# Patient Record
Sex: MALE | Race: OTHER | Employment: OTHER | URBAN - METROPOLITAN AREA
[De-identification: names, ages, dates, MRNs, and addresses within clinical notes are randomized per-mention and may not be internally consistent; named-entity substitution may affect disease eponyms.]

---

## 2021-07-06 ENCOUNTER — APPOINTMENT (OUTPATIENT)
Dept: GENERAL RADIOLOGY | Facility: HOSPITAL | Age: 36
End: 2021-07-06
Attending: EMERGENCY MEDICINE
Payer: COMMERCIAL

## 2021-07-06 ENCOUNTER — HOSPITAL ENCOUNTER (EMERGENCY)
Facility: HOSPITAL | Age: 36
Discharge: HOME | End: 2021-07-06
Attending: EMERGENCY MEDICINE
Payer: COMMERCIAL

## 2021-07-06 VITALS
HEART RATE: 78 BPM | HEIGHT: 73 IN | TEMPERATURE: 98 F | SYSTOLIC BLOOD PRESSURE: 124 MMHG | DIASTOLIC BLOOD PRESSURE: 75 MMHG | OXYGEN SATURATION: 97 % | RESPIRATION RATE: 18 BRPM | BODY MASS INDEX: 41.75 KG/M2 | WEIGHT: 315 LBS

## 2021-07-06 DIAGNOSIS — R07.89 CHEST PAIN, MUSCULOSKELETAL: Primary | ICD-10-CM

## 2021-07-06 LAB
ALBUMIN SERPL-MCNC: 3.8 G/DL (ref 3.4–5)
ALBUMIN/GLOB SERPL: 1.1 {RATIO} (ref 1–2)
ALP LIVER SERPL-CCNC: 67 U/L
ALT SERPL-CCNC: 37 U/L
ANION GAP SERPL CALC-SCNC: 6 MMOL/L (ref 0–18)
AST SERPL-CCNC: 24 U/L (ref 15–37)
ATRIAL RATE: 92 BPM
BASOPHILS # BLD AUTO: 0.04 X10(3) UL (ref 0–0.2)
BASOPHILS NFR BLD AUTO: 0.3 %
BILIRUB SERPL-MCNC: 0.3 MG/DL (ref 0.1–2)
BUN BLD-MCNC: 18 MG/DL (ref 7–18)
BUN/CREAT SERPL: 18 (ref 10–20)
CALCIUM BLD-MCNC: 8.8 MG/DL (ref 8.5–10.1)
CHLORIDE SERPL-SCNC: 105 MMOL/L (ref 98–112)
CO2 SERPL-SCNC: 28 MMOL/L (ref 21–32)
CREAT BLD-MCNC: 1 MG/DL
DEPRECATED RDW RBC AUTO: 39.9 FL (ref 35.1–46.3)
EOSINOPHIL # BLD AUTO: 0.58 X10(3) UL (ref 0–0.7)
EOSINOPHIL NFR BLD AUTO: 4.7 %
ERYTHROCYTE [DISTWIDTH] IN BLOOD BY AUTOMATED COUNT: 12.4 % (ref 11–15)
GLOBULIN PLAS-MCNC: 3.6 G/DL (ref 2.8–4.4)
GLUCOSE BLD-MCNC: 121 MG/DL (ref 70–99)
HCT VFR BLD AUTO: 44.8 %
HGB BLD-MCNC: 15.6 G/DL
IMM GRANULOCYTES # BLD AUTO: 0.04 X10(3) UL (ref 0–1)
IMM GRANULOCYTES NFR BLD: 0.3 %
LYMPHOCYTES # BLD AUTO: 3.7 X10(3) UL (ref 1–4)
LYMPHOCYTES NFR BLD AUTO: 30.2 %
M PROTEIN MFR SERPL ELPH: 7.4 G/DL (ref 6.4–8.2)
MCH RBC QN AUTO: 30.7 PG (ref 26–34)
MCHC RBC AUTO-ENTMCNC: 34.8 G/DL (ref 31–37)
MCV RBC AUTO: 88.2 FL
MONOCYTES # BLD AUTO: 1.19 X10(3) UL (ref 0.1–1)
MONOCYTES NFR BLD AUTO: 9.7 %
NEUTROPHILS # BLD AUTO: 6.7 X10 (3) UL (ref 1.5–7.7)
NEUTROPHILS # BLD AUTO: 6.7 X10(3) UL (ref 1.5–7.7)
NEUTROPHILS NFR BLD AUTO: 54.8 %
OSMOLALITY SERPL CALC.SUM OF ELEC: 291 MOSM/KG (ref 275–295)
P AXIS: 38 DEGREES
P-R INTERVAL: 158 MS
PLATELET # BLD AUTO: 208 10(3)UL (ref 150–450)
POTASSIUM SERPL-SCNC: 3.8 MMOL/L (ref 3.5–5.1)
Q-T INTERVAL: 358 MS
QRS DURATION: 94 MS
QTC CALCULATION (BEZET): 442 MS
R AXIS: 65 DEGREES
RBC # BLD AUTO: 5.08 X10(6)UL
SODIUM SERPL-SCNC: 139 MMOL/L (ref 136–145)
T AXIS: 24 DEGREES
TROPONIN I SERPL-MCNC: <0.045 NG/ML (ref ?–0.04)
VENTRICULAR RATE: 92 BPM
WBC # BLD AUTO: 12.3 X10(3) UL (ref 4–11)

## 2021-07-06 PROCEDURE — 80053 COMPREHEN METABOLIC PANEL: CPT | Performed by: EMERGENCY MEDICINE

## 2021-07-06 PROCEDURE — 85025 COMPLETE CBC W/AUTO DIFF WBC: CPT | Performed by: EMERGENCY MEDICINE

## 2021-07-06 PROCEDURE — 96374 THER/PROPH/DIAG INJ IV PUSH: CPT

## 2021-07-06 PROCEDURE — 93010 ELECTROCARDIOGRAM REPORT: CPT

## 2021-07-06 PROCEDURE — 99284 EMERGENCY DEPT VISIT MOD MDM: CPT

## 2021-07-06 PROCEDURE — 71045 X-RAY EXAM CHEST 1 VIEW: CPT | Performed by: EMERGENCY MEDICINE

## 2021-07-06 PROCEDURE — 93005 ELECTROCARDIOGRAM TRACING: CPT

## 2021-07-06 PROCEDURE — 84484 ASSAY OF TROPONIN QUANT: CPT | Performed by: EMERGENCY MEDICINE

## 2021-07-06 RX ORDER — IBUPROFEN 600 MG/1
600 TABLET ORAL EVERY 8 HOURS PRN
Qty: 30 TABLET | Refills: 0 | Status: SHIPPED | OUTPATIENT
Start: 2021-07-06 | End: 2021-07-13

## 2021-07-06 RX ORDER — FAMOTIDINE 20 MG/1
20 TABLET ORAL 2 TIMES DAILY PRN
Qty: 30 TABLET | Refills: 0 | Status: SHIPPED | OUTPATIENT
Start: 2021-07-06 | End: 2021-08-05

## 2021-07-06 RX ORDER — KETOROLAC TROMETHAMINE 30 MG/ML
30 INJECTION, SOLUTION INTRAMUSCULAR; INTRAVENOUS ONCE
Status: COMPLETED | OUTPATIENT
Start: 2021-07-06 | End: 2021-07-06

## 2021-07-06 NOTE — ED INITIAL ASSESSMENT (HPI)
Pt arrives with c/o left upper chest wall pain. The pain started around 0000. Describes it as pressure that goes up his neck. Pt also has a headache.

## 2021-07-06 NOTE — ED PROVIDER NOTES
Patient Seen in: [facility] Emergency Department      History   Patient presents with:  Chest Pain Angina    Stated Complaint: chest pain    HPI/Subjective:   HPI    The patient is a 49-year-old male with a history of diabetes, presenting to the [facility] Physical Exam  General: Pleasant overweight adult male with a BMI of 44.86, who is conversant, comfortable and well appearing. Alert and oriented in no distress. Neuro: No focal neurologic deficits. No facial droop or slurred speech.   Grossly nor DIFFERENTIAL[[id number]]          Abnormal            Final result                 Please view results for these tests on the individual orders.    RAINBOW DRAW LAVENDER   RAINBOW DRAW LIGHT GREEN   RAINBOW DRAW GOLD     EKG    Rate, intervals and axes as no that he has a sensation like it is hard to swallow, so there may be a component of GERD. So he is to use NSAIDs, and also an H2 blocker. Keep an eye on his symptoms, return if his symptoms worsen. Otherwise follow-up with primary care as an outpatient.

## 2022-10-12 ENCOUNTER — OFFICE VISIT (OUTPATIENT)
Dept: ENDOCRINOLOGY CLINIC | Facility: CLINIC | Age: 37
End: 2022-10-12
Payer: COMMERCIAL

## 2022-10-12 VITALS
WEIGHT: 289.81 LBS | RESPIRATION RATE: 18 BRPM | SYSTOLIC BLOOD PRESSURE: 128 MMHG | DIASTOLIC BLOOD PRESSURE: 88 MMHG | OXYGEN SATURATION: 98 % | HEART RATE: 99 BPM | BODY MASS INDEX: 38 KG/M2

## 2022-10-12 DIAGNOSIS — N48.1 BALANITIS: ICD-10-CM

## 2022-10-12 DIAGNOSIS — E11.65 TYPE 2 DIABETES MELLITUS WITH HYPERGLYCEMIA, WITHOUT LONG-TERM CURRENT USE OF INSULIN (HCC): Primary | ICD-10-CM

## 2022-10-12 LAB
BILIRUBIN: NEGATIVE
CARTRIDGE LOT#: 505 NUMERIC
CREAT UR-SCNC: 35.3 MG/DL
GLUCOSE (URINE DIPSTICK): 500 MG/DL
HEMOGLOBIN A1C: 13.2 % (ref 4.3–5.6)
KETONES (URINE DIPSTICK): 40 MG/DL
LEUKOCYTES: NEGATIVE
MICROALBUMIN UR-MCNC: 5.66 MG/DL
MICROALBUMIN/CREAT 24H UR-RTO: 160.3 UG/MG (ref ?–30)
MULTISTIX LOT#: ABNORMAL NUMERIC
NITRITE, URINE: NEGATIVE
PH, URINE: 6 (ref 4.5–8)
PROTEIN (URINE DIPSTICK): NEGATIVE MG/DL
SPECIFIC GRAVITY: 1.01 (ref 1–1.03)
TEST STRIP LOT #: NORMAL NUMERIC
UROBILINOGEN,SEMI-QN: 0.2 MG/DL (ref 0–1.9)

## 2022-10-12 PROCEDURE — 82947 ASSAY GLUCOSE BLOOD QUANT: CPT | Performed by: NURSE PRACTITIONER

## 2022-10-12 PROCEDURE — 83036 HEMOGLOBIN GLYCOSYLATED A1C: CPT | Performed by: NURSE PRACTITIONER

## 2022-10-12 PROCEDURE — 3074F SYST BP LT 130 MM HG: CPT | Performed by: NURSE PRACTITIONER

## 2022-10-12 PROCEDURE — 81003 URINALYSIS AUTO W/O SCOPE: CPT | Performed by: NURSE PRACTITIONER

## 2022-10-12 PROCEDURE — 99204 OFFICE O/P NEW MOD 45 MIN: CPT | Performed by: NURSE PRACTITIONER

## 2022-10-12 PROCEDURE — 3079F DIAST BP 80-89 MM HG: CPT | Performed by: NURSE PRACTITIONER

## 2022-10-12 RX ORDER — FLUCONAZOLE 150 MG/1
TABLET ORAL
Qty: 2 TABLET | Refills: 0 | Status: SHIPPED | OUTPATIENT
Start: 2022-10-12

## 2022-10-12 RX ORDER — INSULIN DEGLUDEC INJECTION 100 U/ML
10 INJECTION, SOLUTION SUBCUTANEOUS DAILY
Qty: 3 ML | Refills: 0 | COMMUNITY
Start: 2022-10-12

## 2022-10-12 RX ORDER — SEMAGLUTIDE 1.34 MG/ML
0.25 INJECTION, SOLUTION SUBCUTANEOUS
Qty: 1.5 ML | Refills: 0 | Status: SHIPPED | COMMUNITY
Start: 2022-10-12 | End: 2022-11-03

## 2022-10-12 RX ORDER — LANCETS
1 EACH MISCELLANEOUS 3 TIMES DAILY
Qty: 100 EACH | Refills: 2 | Status: SHIPPED | OUTPATIENT
Start: 2022-10-12 | End: 2023-10-12

## 2022-10-13 LAB
BILIRUB UR QL: NEGATIVE
CLARITY UR: CLEAR
COLOR UR: YELLOW
GLUCOSE UR-MCNC: >=500 MG/DL
HGB UR QL STRIP.AUTO: NEGATIVE
KETONES UR-MCNC: 20 MG/DL
LEUKOCYTE ESTERASE UR QL STRIP.AUTO: NEGATIVE
NITRITE UR QL STRIP.AUTO: NEGATIVE
PH UR: 6 [PH] (ref 5–8)
PROT UR-MCNC: NEGATIVE MG/DL
SP GR UR STRIP: >1.03 (ref 1–1.03)
UROBILINOGEN UR STRIP-ACNC: <2
VIT C UR-MCNC: NEGATIVE MG/DL

## 2022-10-28 ENCOUNTER — OFFICE VISIT (OUTPATIENT)
Dept: ENDOCRINOLOGY CLINIC | Facility: CLINIC | Age: 37
End: 2022-10-28
Payer: COMMERCIAL

## 2022-10-28 VITALS
BODY MASS INDEX: 38 KG/M2 | OXYGEN SATURATION: 98 % | SYSTOLIC BLOOD PRESSURE: 110 MMHG | RESPIRATION RATE: 18 BRPM | WEIGHT: 290.19 LBS | DIASTOLIC BLOOD PRESSURE: 78 MMHG | HEART RATE: 92 BPM

## 2022-10-28 DIAGNOSIS — E11.65 TYPE 2 DIABETES MELLITUS WITH HYPERGLYCEMIA, WITHOUT LONG-TERM CURRENT USE OF INSULIN (HCC): Primary | ICD-10-CM

## 2022-10-28 PROCEDURE — 99214 OFFICE O/P EST MOD 30 MIN: CPT | Performed by: NURSE PRACTITIONER

## 2022-10-28 PROCEDURE — 3078F DIAST BP <80 MM HG: CPT | Performed by: NURSE PRACTITIONER

## 2022-10-28 PROCEDURE — 3074F SYST BP LT 130 MM HG: CPT | Performed by: NURSE PRACTITIONER

## 2022-10-28 RX ORDER — PERPHENAZINE 16 MG/1
TABLET, FILM COATED ORAL
Qty: 200 EACH | Refills: 0 | Status: SHIPPED | OUTPATIENT
Start: 2022-10-28

## 2022-10-28 RX ORDER — INSULIN DEGLUDEC INJECTION 100 U/ML
30 INJECTION, SOLUTION SUBCUTANEOUS DAILY
Qty: 30 ML | Refills: 1 | Status: SHIPPED | OUTPATIENT
Start: 2022-10-28

## 2022-10-28 RX ORDER — SEMAGLUTIDE 1.34 MG/ML
0.5 INJECTION, SOLUTION SUBCUTANEOUS
Qty: 1.5 ML | Refills: 2 | Status: SHIPPED | OUTPATIENT
Start: 2022-10-28

## 2022-11-18 ENCOUNTER — OFFICE VISIT (OUTPATIENT)
Dept: ENDOCRINOLOGY CLINIC | Facility: CLINIC | Age: 37
End: 2022-11-18
Payer: COMMERCIAL

## 2022-11-18 VITALS
WEIGHT: 293.63 LBS | SYSTOLIC BLOOD PRESSURE: 122 MMHG | RESPIRATION RATE: 18 BRPM | OXYGEN SATURATION: 98 % | BODY MASS INDEX: 39 KG/M2 | DIASTOLIC BLOOD PRESSURE: 82 MMHG | HEART RATE: 90 BPM

## 2022-11-18 DIAGNOSIS — E11.65 TYPE 2 DIABETES MELLITUS WITH HYPERGLYCEMIA, WITHOUT LONG-TERM CURRENT USE OF INSULIN (HCC): Primary | ICD-10-CM

## 2022-11-18 LAB
CARTRIDGE LOT#: 524 NUMERIC
HEMOGLOBIN A1C: 10.4 % (ref 4.3–5.6)

## 2023-10-13 DIAGNOSIS — E11.65 TYPE 2 DIABETES MELLITUS WITH HYPERGLYCEMIA, WITHOUT LONG-TERM CURRENT USE OF INSULIN (HCC): ICD-10-CM

## 2023-10-16 NOTE — TELEPHONE ENCOUNTER
Requested Prescriptions     Pending Prescriptions Disp Refills    METFORMIN HCL 1000 MG Oral Tab [Pharmacy Med Name: METFORMIN 1000MG TABLETS] 180 tablet 1     Sig: TAKE 1 TABLET(1000 MG) BY MOUTH TWICE DAILY WITH MEALS     Last A1c value was 10.4% done 11/18/2022.     Refill 10/12/22  LOV 11/18/22  No FU - pt hasn't been seen in almost a year

## 2024-06-06 DIAGNOSIS — E11.65 TYPE 2 DIABETES MELLITUS WITH HYPERGLYCEMIA, WITHOUT LONG-TERM CURRENT USE OF INSULIN (HCC): ICD-10-CM

## 2024-06-07 NOTE — TELEPHONE ENCOUNTER
Please inform pt since he has not been seen in over a year we cannot refill medications - will need to discuss with pcp

## 2024-07-10 ENCOUNTER — HOSPITAL ENCOUNTER (EMERGENCY)
Facility: HOSPITAL | Age: 39
Discharge: HOME | End: 2024-07-10
Attending: EMERGENCY MEDICINE
Payer: COMMERCIAL

## 2024-07-10 ENCOUNTER — APPOINTMENT (OUTPATIENT)
Dept: GENERAL RADIOLOGY | Facility: HOSPITAL | Age: 39
End: 2024-07-10
Attending: EMERGENCY MEDICINE
Payer: COMMERCIAL

## 2024-07-10 VITALS
WEIGHT: 291 LBS | HEART RATE: 80 BPM | BODY MASS INDEX: 38 KG/M2 | SYSTOLIC BLOOD PRESSURE: 122 MMHG | OXYGEN SATURATION: 100 % | RESPIRATION RATE: 13 BRPM | DIASTOLIC BLOOD PRESSURE: 81 MMHG | TEMPERATURE: 97 F

## 2024-07-10 DIAGNOSIS — B37.42 CANDIDAL BALANITIS: Primary | ICD-10-CM

## 2024-07-10 DIAGNOSIS — R73.9 HYPERGLYCEMIA: ICD-10-CM

## 2024-07-10 LAB
ALBUMIN SERPL-MCNC: 3.8 G/DL (ref 3.4–5)
ALBUMIN/GLOB SERPL: 1 {RATIO} (ref 1–2)
ALP LIVER SERPL-CCNC: 64 U/L
ALT SERPL-CCNC: 37 U/L
ANION GAP SERPL CALC-SCNC: 10 MMOL/L (ref 0–18)
AST SERPL-CCNC: 14 U/L (ref 15–37)
BASOPHILS # BLD AUTO: 0.02 X10(3) UL (ref 0–0.2)
BASOPHILS NFR BLD AUTO: 0.2 %
BILIRUB SERPL-MCNC: 0.5 MG/DL (ref 0.1–2)
BUN BLD-MCNC: 14 MG/DL (ref 9–23)
CALCIUM BLD-MCNC: 9.3 MG/DL (ref 8.5–10.1)
CHLORIDE SERPL-SCNC: 103 MMOL/L (ref 98–112)
CO2 SERPL-SCNC: 26 MMOL/L (ref 21–32)
CREAT BLD-MCNC: 1.02 MG/DL
EGFRCR SERPLBLD CKD-EPI 2021: 96 ML/MIN/1.73M2 (ref 60–?)
EOSINOPHIL # BLD AUTO: 0.38 X10(3) UL (ref 0–0.7)
EOSINOPHIL NFR BLD AUTO: 4.2 %
ERYTHROCYTE [DISTWIDTH] IN BLOOD BY AUTOMATED COUNT: 12.1 %
GLOBULIN PLAS-MCNC: 3.7 G/DL (ref 2.8–4.4)
GLUCOSE BLD-MCNC: 287 MG/DL (ref 70–99)
GLUCOSE BLD-MCNC: 316 MG/DL (ref 70–99)
GLUCOSE BLD-MCNC: 362 MG/DL (ref 70–99)
HCT VFR BLD AUTO: 43.3 %
HGB BLD-MCNC: 16.2 G/DL
IMM GRANULOCYTES # BLD AUTO: 0.05 X10(3) UL (ref 0–1)
IMM GRANULOCYTES NFR BLD: 0.6 %
LYMPHOCYTES # BLD AUTO: 2.58 X10(3) UL (ref 1–4)
LYMPHOCYTES NFR BLD AUTO: 28.7 %
MCH RBC QN AUTO: 31.6 PG (ref 26–34)
MCHC RBC AUTO-ENTMCNC: 37.4 G/DL (ref 31–37)
MCV RBC AUTO: 84.4 FL
MONOCYTES # BLD AUTO: 0.78 X10(3) UL (ref 0.1–1)
MONOCYTES NFR BLD AUTO: 8.7 %
NEUTROPHILS # BLD AUTO: 5.18 X10 (3) UL (ref 1.5–7.7)
NEUTROPHILS # BLD AUTO: 5.18 X10(3) UL (ref 1.5–7.7)
NEUTROPHILS NFR BLD AUTO: 57.6 %
OSMOLALITY SERPL CALC.SUM OF ELEC: 301 MOSM/KG (ref 275–295)
PLATELET # BLD AUTO: 203 10(3)UL (ref 150–450)
POTASSIUM SERPL-SCNC: 4.2 MMOL/L (ref 3.5–5.1)
PROT SERPL-MCNC: 7.5 G/DL (ref 6.4–8.2)
RBC # BLD AUTO: 5.13 X10(6)UL
SODIUM SERPL-SCNC: 139 MMOL/L (ref 136–145)
TROPONIN I SERPL HS-MCNC: <3 NG/L
WBC # BLD AUTO: 9 X10(3) UL (ref 4–11)

## 2024-07-10 PROCEDURE — 84484 ASSAY OF TROPONIN QUANT: CPT | Performed by: EMERGENCY MEDICINE

## 2024-07-10 PROCEDURE — 36415 COLL VENOUS BLD VENIPUNCTURE: CPT

## 2024-07-10 PROCEDURE — 85025 COMPLETE CBC W/AUTO DIFF WBC: CPT | Performed by: EMERGENCY MEDICINE

## 2024-07-10 PROCEDURE — 71045 X-RAY EXAM CHEST 1 VIEW: CPT | Performed by: EMERGENCY MEDICINE

## 2024-07-10 PROCEDURE — 93010 ELECTROCARDIOGRAM REPORT: CPT

## 2024-07-10 PROCEDURE — 85025 COMPLETE CBC W/AUTO DIFF WBC: CPT

## 2024-07-10 PROCEDURE — 82962 GLUCOSE BLOOD TEST: CPT

## 2024-07-10 PROCEDURE — 99285 EMERGENCY DEPT VISIT HI MDM: CPT

## 2024-07-10 PROCEDURE — 80053 COMPREHEN METABOLIC PANEL: CPT | Performed by: EMERGENCY MEDICINE

## 2024-07-10 PROCEDURE — 80053 COMPREHEN METABOLIC PANEL: CPT

## 2024-07-10 PROCEDURE — 93005 ELECTROCARDIOGRAM TRACING: CPT

## 2024-07-10 PROCEDURE — 99284 EMERGENCY DEPT VISIT MOD MDM: CPT

## 2024-07-10 RX ORDER — INSULIN DEGLUDEC 100 U/ML
30 INJECTION, SOLUTION SUBCUTANEOUS DAILY
Qty: 9 ML | Refills: 0 | Status: SHIPPED | OUTPATIENT
Start: 2024-07-10 | End: 2024-08-09

## 2024-07-10 RX ORDER — FLUCONAZOLE 150 MG/1
150 TABLET ORAL ONCE
Qty: 1 TABLET | Refills: 0 | Status: SHIPPED | OUTPATIENT
Start: 2024-07-10 | End: 2024-07-10

## 2024-07-10 NOTE — ED INITIAL ASSESSMENT (HPI)
Pt reports his BG has been elevated for the past few days up to 400s and also has yeast infection x 1 week, also c/o HA, denies dizziness/blurred vision; pt takes metformin \"sometimes\" for diabetes management

## 2024-07-11 ENCOUNTER — TELEPHONE (OUTPATIENT)
Dept: CASE MANAGEMENT | Facility: HOSPITAL | Age: 39
End: 2024-07-11

## 2024-07-11 LAB
ATRIAL RATE: 90 BPM
P AXIS: 36 DEGREES
P-R INTERVAL: 150 MS
Q-T INTERVAL: 352 MS
QRS DURATION: 90 MS
QTC CALCULATION (BEZET): 430 MS
R AXIS: 51 DEGREES
T AXIS: 14 DEGREES
VENTRICULAR RATE: 90 BPM

## 2024-07-11 NOTE — CM/SW NOTE
Insulin comes in packs of 5 (15ml).  MD ordered (9ml) 3 packs   Pharmacist is unable to break packs open.  Okay'd increasing RX to 15ml with same administration directions.

## 2024-07-11 NOTE — ED QUICK NOTES
Rounding Completed    Plan of Care reviewed. Waiting for urinalysis. I asked patient to provide a urine sample. He stated he can't give us a sample yet he feels like he needs to drink water. .Elimination needs assessed.  Bed is locked and in lowest position. Call light within reach.

## 2024-07-11 NOTE — ED PROVIDER NOTES
Patient Seen in: [facility] Emergency Department      History     Chief Complaint   Patient presents with    Hyperglycemia     Stated Complaint: hyperglycemia    Subjective:   HPI    Patient presents with hyperglycemia.  The patient states that he has been out of his Tresiba for the past couple of months.  He does have metformin but is not taking it very consistently.  His blood sugar has been running high and often over 400 in the past few days.  He states he has a anxious feeling like his blood sugar is high but denies any particular pain.  He does not feel dizziness or blurry vision.  He is going to see a new physician with [facility] but has not seen them yet.  He also is complaining of a yeast infection to his penis that has been recurrent and is requesting medication for that.    Objective:   Past Medical History:    Diabetes (HCC)              History reviewed. No pertinent surgical history.             Social History     Socioeconomic History    Marital status:    Tobacco Use    Smoking status: Former     Current packs/day: 0.00     Types: Cigarettes     Quit date: 10/12/2013     Years since quitting: 10.7    Smokeless tobacco: Never   Vaping Use    Vaping status: Never Used   Substance and Sexual Activity    Alcohol use: Yes     Alcohol/week: 2.0 - 3.0 standard drinks of alcohol     Types: 2 - 3 Cans of beer per week     Comment: once a month    Drug use: Not Currently     Social Determinants of Health      Received from [facility]              Review of Systems    Positive for stated Chief Complaint: Hyperglycemia    Other systems are as noted in HPI.  Constitutional and vital signs reviewed.      All other systems reviewed and negative except as noted above.    Physical Exam     ED Triage Vitals [07/10/24 1615]   BP (!) 141/99   Pulse 101   Resp 18   Temp 97 °F (36.1 °C)   Temp src Temporal   SpO2 98 %   O2 Device None (Room air)       Current Vitals:   Vital Signs  BP:  122/81  Pulse: 80  Resp: 13  Temp: 97 °F (36.1 °C)  Temp src: Temporal  MAP (mmHg): 93    Oxygen Therapy  SpO2: 100 %  O2 Device: None (Room air)            Physical Exam    General: Alert and oriented x3 in no acute distress.  HEENT: Normocephalic, atraumatic, pupils equal round and reactive to light, oropharynx clear, uvula midline.  Neck: Supple.  Cardiovascular: Regular rate and rhythm, no murmurs.  Respiratory: Lungs clear to auscultation.  Abdomen: Soft, nontender, no rebound or guarding, normal active bowel sounds, no CVA tenderness.  : Whitish discharge noted beneath the foreskin and minimal associated erythema.  Extremities: No CCE.  Skin: Warm and dry.    ED Course     Labs Reviewed   COMP METABOLIC PANEL (14) - Abnormal; Notable for the following components:       Result Value    Glucose 316 (*)     Calculated Osmolality 301 (*)     AST 14 (*)     All other components within normal limits   POCT GLUCOSE - Abnormal; Notable for the following components:    POC Glucose 362 (*)     All other components within normal limits   POCT GLUCOSE - Abnormal; Notable for the following components:    POC Glucose 287 (*)     All other components within normal limits   CBC W/ DIFFERENTIAL - Abnormal; Notable for the following components:    MCHC 37.4 (*)     All other components within normal limits   TROPONIN I HIGH SENSITIVITY - Normal   CBC WITH DIFFERENTIAL WITH PLATELET    Narrative:     The following orders were created for panel order CBC With Differential With Platelet.  Procedure                               Abnormality         Status                     ---------                               -----------         ------                     CBC W/ DIFFERENTIAL[[id number]]          Abnormal            Final result                 Please view results for these tests on the individual orders.   URINALYSIS, ROUTINE   RAINBOW DRAW LAVENDER   RAINBOW DRAW LIGHT GREEN   RAINBOW DRAW GOLD   RAINBOW DRAW BLUE      EKG    Rate, intervals and axes as noted on EKG Report.  Rate: 86  Rhythm: Sinus Rhythm  Reading: No acute ischemic abnormality         I personally reviewed the chest films and no infiltrate or edema noted.        XR CHEST AP PORTABLE  (CPT=71045)    Result Date: 7/10/2024  PROCEDURE:  XR CHEST AP PORTABLE  (CPT=71045)  TECHNIQUE:  AP chest radiograph was obtained.  COMPARISON:  [facility] , XR, XR CHEST AP PORTABLE  (CPT=71045), 7/06/2021, 2:44 AM.  INDICATIONS:  hyperglycemia  PATIENT STATED HISTORY: (As transcribed by Technologist)  Patient is expeirencing hyperglycemia. Patient offered no additional history at this time.   FINDINGS:  The heart is normal in size.  The lungs are clear of acute-appearing disease process.  The costophrenic angles are sharp.  There is no active disease seen on the basis of portable chest radiography.            CONCLUSION:  No active disease seen.   LOCATION:  [facility]      Dictated by (CST): Nixon Caraballo MD on 7/10/2024 at 7:13 PM     Finalized by (CST): Nixon Caraballo MD on 7/10/2024 at 7:13 PM        Patient was given a bolus of normal saline for his hyperglycemia.       MDM      Patient presents with hyperglycemia, penile discharge and an anxious feeling.  Differential diagnosis includes but is not limited to medication noncompliance, DKA, acute coronary syndrome and yeast infection.  The patient does not have acute ischemic changes on EKG and does not have typical symptoms of acute coronary syndrome.  His cardiac enzymes have come back negative effectively ruling this out.  He does have hyperglycemia but no evidence of DKA.  I think his symptoms are likely due to medication noncompliance.  He stated that if I refilled the Tresiba he would be able to get it and I will do so.  He does also have evidence of recurrent candidal balanitis.  I will treat him with Diflucan as he states this has helped in the past.  If his condition worsens, he should return for repeat evaluation.   Otherwise he will follow-up with his new PCP.                           MDM    Disposition and Plan     Clinical Impression:  1. Candidal balanitis    2. Hyperglycemia         Disposition:  Discharge  7/10/2024  8:26 pm    Follow-up:  No follow-up provider specified.        Medications Prescribed:  Discharge Medication List as of 7/10/2024  8:41 PM        START taking these medications    Details   fluconazole 150 MG Oral Tab Take 1 tablet (150 mg total) by mouth once for 1 dose., Normal, Disp-1 tablet, R-0      !! insulin degludec (TRESIBA FLEXTOUCH) 100 UNIT/ML Subcutaneous Solution Pen-injector Inject 30 Units into the skin daily., Normal, Disp-9 mL, R-0       !! - Potential duplicate medications found. Please discuss with provider.

## 2024-07-16 ENCOUNTER — TELEPHONE (OUTPATIENT)
Facility: CLINIC | Age: 39
End: 2024-07-16

## 2024-07-16 ENCOUNTER — OFFICE VISIT (OUTPATIENT)
Facility: CLINIC | Age: 39
End: 2024-07-16
Payer: COMMERCIAL

## 2024-07-16 VITALS
WEIGHT: 290 LBS | HEART RATE: 104 BPM | SYSTOLIC BLOOD PRESSURE: 128 MMHG | DIASTOLIC BLOOD PRESSURE: 82 MMHG | OXYGEN SATURATION: 98 % | BODY MASS INDEX: 38.43 KG/M2 | HEIGHT: 73 IN

## 2024-07-16 DIAGNOSIS — R80.9 PROTEINURIA DUE TO TYPE 2 DIABETES MELLITUS (HCC): ICD-10-CM

## 2024-07-16 DIAGNOSIS — Z79.4 TYPE 2 DIABETES MELLITUS WITH HYPERGLYCEMIA, WITH LONG-TERM CURRENT USE OF INSULIN (HCC): Primary | ICD-10-CM

## 2024-07-16 DIAGNOSIS — E11.29 PROTEINURIA DUE TO TYPE 2 DIABETES MELLITUS (HCC): ICD-10-CM

## 2024-07-16 DIAGNOSIS — B37.9 CANDIDIASIS: ICD-10-CM

## 2024-07-16 DIAGNOSIS — E11.65 TYPE 2 DIABETES MELLITUS WITH HYPERGLYCEMIA, WITH LONG-TERM CURRENT USE OF INSULIN (HCC): Primary | ICD-10-CM

## 2024-07-16 LAB — HEMOGLOBIN A1C: 12.1 % (ref 4.3–5.6)

## 2024-07-16 PROCEDURE — 99215 OFFICE O/P EST HI 40 MIN: CPT | Performed by: NURSE PRACTITIONER

## 2024-07-16 PROCEDURE — 83036 HEMOGLOBIN GLYCOSYLATED A1C: CPT | Performed by: NURSE PRACTITIONER

## 2024-07-16 RX ORDER — FLUCONAZOLE 150 MG/1
150 TABLET ORAL ONCE
Qty: 1 TABLET | Refills: 1 | Status: SHIPPED | OUTPATIENT
Start: 2024-07-16 | End: 2024-07-16

## 2024-07-16 RX ORDER — BLOOD-GLUCOSE SENSOR
1 EACH MISCELLANEOUS
Qty: 6 EACH | Refills: 0 | Status: SHIPPED | OUTPATIENT
Start: 2024-07-16

## 2024-07-16 RX ORDER — SEMAGLUTIDE 0.68 MG/ML
INJECTION, SOLUTION SUBCUTANEOUS
Qty: 1 EACH | Refills: 0 | Status: SHIPPED | OUTPATIENT
Start: 2024-07-16 | End: 2024-08-29

## 2024-07-16 RX ORDER — INSULIN DEGLUDEC 100 U/ML
36 INJECTION, SOLUTION SUBCUTANEOUS DAILY
Qty: 32 ML | Refills: 0 | Status: SHIPPED | OUTPATIENT
Start: 2024-07-16 | End: 2024-10-14

## 2024-07-16 NOTE — PATIENT INSTRUCTIONS
Follow up plan:  With IMELDA Smith: Return in about 6 weeks (around 8/27/2024).  - start ozempic 0.25 mg once a week for 2 weeks, then increase to 0.5 mg once a week for 4 weeks, then will titrate it up to 1 mg if you can tolerate medicine.   - restart metformin 1000 mg twice a day   - increase tresiba to 36 units every morning   - check glucose twice a day fasting and 2 hrs after meal  - if next week your glucose is still above 250, message me in the pt portal and will increase the units of insulin  - get fasting labs 1 week prior your follow up with me  - see ophthalmology  Ophthalmologist for diabetes eye exam:  [facility]: Dr Nereida Arriaga- [address]  [facility] [address], [place] [address]- PHONE: [phone]  [facility]: [phone]  Dr. Oppenheim in [place]- [phone]  [facility]: Phone: [phone], Address: [address] [place] [id number]  If you have Medicaid: [facility] [place]- [phone], [facility]- [phone]- [place], [facility], or [employer]  [facility]: [facility] - [phone]  [place]: Mika Samuels- [employer]- they can do screenings using their machine  [facility]- [phone] - in [place]  Tova Harp MD - [place]       - meet with Sheldon (our diabetes educator) in 3 weeks for:   Adjustment of insulin if needed to reach targeted glucose goals  - Visits with Monalisa are considered a 'nurse visit' and are an extension of your services you receive here. The visit will appear on your Mychart as a scheduled visit so you know when she is going to call you or meet with you. If you need to reschedule, please call and cancel to avoid a no show fee. If you do not show up to your appointment or miss her call within 15 minutes of the visit, you will be assessed a $40 late fee.        Otherwise, continue DM Meds: metFORMIN HCl Tabs - 1000 MG; Tresiba FlexTouch Sopn - 100 UNIT/ML, 100  UNIT/ML       Blood sugar targets:  Before breakfast: , 2 hours after meals: <180 (preferably <150), A1C goal: <7.0%  Time in Range goal is higher than 80% if using a continuous glucose monitor (Dexcom or Renetta).  Time in Range can be found within the Dexcom G7 gunnar, on Clarity if using Dexcom G6, or on LibreView if using Renetta.    - Follow Balanced diet: carbohydrates, protein, healthy fats and fiber in each meal. Exercise of at least 150 mins each week. Decrease your simple carbohydrates intake such as sweets: cookies, soda, candy, white rice, white pasta, syrups    - Decrease saturated fat, trans fat, and cholesterol intake  example: Butter, lard, shortening, coconut, coconut oil, palm oil, fried food, bologna,pepperoni, salami, egg yolks, Poultry skin, visible meat fat.      HOW TO TREAT LOW BLOOD SUGAR (Hypoglycemia)  Low blood sugar= Less than 70, or if you start to have symptoms (below)  Symptoms: Shaking or trembling, fast heart rate, extreme hunger, sweating, confusion/difficulty concentrating, dizziness.    How to treat a low blood sugar if you are able to eat/drink: The Rule of 15  If you are using continuous glucose monitor that says you are low, but you do not have any symptoms, verify on fingerstick that your blood sugar is actually low before treating.   Eat 15 grams of carbs (see examples below)  Check your blood sugar after 15 minutes. If it’s still below your target range, have another serving.   Repeat these steps until it’s in your target range. Once it’s in range, if you're nervous about your sugar going low again, have a protein source (ie, a spoonful of peanut butter).   If you have a CGM you want to look for how your arrow has changed. If you arrow is pointed up or sideways after 15 min, give your CGM more time OR check with a finger stick. Try not to eat more food until at least 15 min after the first BG check - otherwise you risk having a rebound high.  If you are experiencing symptoms  and you are unable to check your blood glucose for any reason, treat the hypoglycemia.  If someone has a low blood sugar and is unconscious: Don’t hesitate to call 911. If someone is unconscious and glucagon is not available or someone does not know how to use it, call 911 immediately.    To treat a low, I recommend you carry with you easy, pre-portioned treatment for low blood sugars that are 15G of carbs:   - Children sized squeeze pouch applesauce (high fiber + carbs help prevent too high of a spike)  - Small children's sized juicebox- 15g carb --> 4oz juice box  - Glucose tablets from [employer], you can find them near diabetes supplies --> Note, you will need to eat 3-4 tablets to get to 15g of carbs  - Children sized fruit snack pack- look for one with 15 grams of total carbohydrate  - Choice of how to treat your low is important. Complex carbs, or foods that contain fats along with carbs (like chocolate) can slow the absorption of glucose and should NOT be used to treat an emergency low    Thank you for visiting our office. We look forward to working with you to reach your health goals. As a reminder, if you need refills, please request early so there is enough time to process the request. We ask that you provide at least 5 days' notice before a refill is due, so there is time to send a request to pharmacy, process the refill, and ensure there are no other problems with obtaining the medication (backorders, prior authorization paperwork, etc). Routine refills will not be addressed on weekends, so please submit these requests during the week.

## 2024-07-16 NOTE — PROGRESS NOTES
[facility] Note    Name: Jennifer Harrison    Date: 2024    Jennifer Harrison is a 38 year old male who presents for evaluation of T2DM management.     Chief complaint: New Patient (NP here to establish care for DM2, c/o high BG's pt states that he has a yeast infection on going 2 weeks./Pt states that he checks sugars by finger stick. Pt sates today fasting 433/Pt states that he's been off metformin for about 1 week- no refills.)   Last office visit for DM mgmt of the pt:  2022  Mgmt by:  Monika Coker NP from [facility]      Subjective:   Initial HPI consult in 2024  States off for metformin for a week, had a yeast infection for 2 weeks, had a treatment already     DM hx:  -Diagnosed with diabetes in   -Family history- yes, strong diabetes family hx    -Re: potential DM medication contraindication (if positive, checkbox selected):  [] history of pancreatitis denies  [] personal/fam hx of medullary thyroid ca/MEN2- denies    [x] History of recent/frequent UTI/yeast infxn-  for a bout 2-3 weeks, took diflucan once minimized symptoms of frequency urination, pruritus, w/ white discharge    -Presence of associated DM complications (if positive, checkbox selected):  [] Macrovascular complications (CAD/CVA/PAD)denies  [] Neuropathy denies  [] Retinopathy denies, last seen optometrist 5 years ago   [x] Nephropathy- proteinuria  [] HTN denies  [] Hyperlipidemia denies  [] Stroke/TIA denies  [] Gastroparesis    -Lifestyle: eat outside 1-2x a week, not exercising   - Modifying factors: missed 2 days in  a week of his tresiba, run out of metformin in the last week     DM meds at first office visit: Tresiba 30 units qam, Metformin 1000 mg BID  Previously trialed/failed DM meds: ozempic ( insurance did not covered later on)  Hx of DKA in the past: once    Blood Glucose Checking 1-3 times per day. Patient did not bring his glucometer today, pt. reported range  Morning/fastin-436, post-meal:  220-400s, episodes of hypoglycemia: None, lowest 90 in the last year            History/Other:    Allergies, PMH, SocHx and FHx reviewed and updated as appropriate in Epic on    Continuous Glucose Sensor (FREESTYLE ALEXSANDRA 3 SENSOR) Does not apply Misc 1 each every 14 (fourteen) days. 6 each 0    metFORMIN HCl 1000 MG Oral Tab Take 1 tablet (1,000 mg total) by mouth 2 (two) times daily with meals. 180 tablet 1    semaglutide (OZEMPIC, 0.25 OR 0.5 MG/DOSE,) 2 MG/3ML Subcutaneous Solution Pen-injector Inject 0.25 mg into the skin once a week for 14 days, THEN 0.5 mg once a week. 1 each 0    fluconazole (DIFLUCAN) 150 MG Oral Tab Take 1 tablet (150 mg total) by mouth once for 1 dose. May repeat it after 72 hours 1 tablet 1    insulin degludec (TRESIBA FLEXTOUCH) 100 UNIT/ML Subcutaneous Solution Pen-injector Inject 36 Units into the skin daily. 32 mL 0    insulin degludec (TRESIBA FLEXTOUCH) 100 UNIT/ML Subcutaneous Solution Pen-injector Inject 30 Units into the skin daily. 9 mL 0    Glucose Blood (CONTOUR NEXT TEST) In Vitro Strip Check Glucose twice daily 200 each 0    Insulin Pen Needle 32G X 4 MM Does not apply Misc Daily with insulin injection 90 each 0     No Known Allergies  Current Outpatient Medications   Medication Sig Dispense Refill    Continuous Glucose Sensor (FREESTYLE AELXSANDRA 3 SENSOR) Does not apply Misc 1 each every 14 (fourteen) days. 6 each 0    metFORMIN HCl 1000 MG Oral Tab Take 1 tablet (1,000 mg total) by mouth 2 (two) times daily with meals. 180 tablet 1    semaglutide (OZEMPIC, 0.25 OR 0.5 MG/DOSE,) 2 MG/3ML Subcutaneous Solution Pen-injector Inject 0.25 mg into the skin once a week for 14 days, THEN 0.5 mg once a week. 1 each 0    fluconazole (DIFLUCAN) 150 MG Oral Tab Take 1 tablet (150 mg total) by mouth once for 1 dose. May repeat it after 72 hours 1 tablet 1    insulin degludec (TRESIBA FLEXTOUCH) 100 UNIT/ML Subcutaneous Solution Pen-injector Inject 36 Units into the skin daily. 32 mL 0     insulin degludec (TRESIBA FLEXTOUCH) 100 UNIT/ML Subcutaneous Solution Pen-injector Inject 30 Units into the skin daily. 9 mL 0    Glucose Blood (CONTOUR NEXT TEST) In Vitro Strip Check Glucose twice daily 200 each 0    Insulin Pen Needle 32G X 4 MM Does not apply Misc Daily with insulin injection 90 each 0     Past Medical History:    Abscess of leg    Diabetes (HCC)    DKA (diabetic ketoacidosis) (HCC)     Family History   Problem Relation Age of Onset    Other (type 2 diabetes) Mother     Other (type 2 diabetes) Father     Stroke Father     Other (type 2 diabetes) Maternal Grandmother     Other (type 2 diabetes) Maternal Grandfather     Other (type 2 diabetes) Paternal Grandmother     Cancer Paternal Grandmother     No Known Problems Other      Social history: Reviewed.      ROS/Exam    REVIEW OF SYSTEMS: Ten point review of systems has been performed and is otherwise negative and/or non-contributory, except as described above.     VITALS  Vitals:    07/16/24 0739   BP: 128/82   BP Location: Right arm   Patient Position: Sitting   Cuff Size: large   Pulse: 104   SpO2: 98%   Weight: 290 lb (131.5 kg)   Height: 6' 1\" (1.854 m)       Wt Readings from Last 6 Encounters:   07/16/24 290 lb (131.5 kg)   07/10/24 291 lb (132 kg)   11/18/22 293 lb 9.6 oz (133.2 kg)   10/28/22 290 lb 3.2 oz (131.6 kg)   10/12/22 289 lb 12.8 oz (131.5 kg)   07/06/21 (!) 340 lb (154.2 kg)       PHYSICAL EXAM  CONSTITUTIONAL:  awake, alert, cooperative, no apparent distress, and appears stated age, Vss stable   PSYCH: normal affect  LUNGS: breathing comfortably, LSCTAB  CARDIOVASCULAR:  regular rate, no pedal edema  ENT:  no palpable thyroid nodules, no lympadenopathy     Labs/Imaging:   No results found for: \"CHOLEST\", \"TRIG\", \"HDL\", \"LDL\"  Lab Results   Component Value Date    MICROALBCREA 160.3 (H) 10/12/2022      Lab Results   Component Value Date    CREATSERUM 1.02 07/10/2024    CREATSERUM 1.00 07/06/2021    EGFRCR 96 07/10/2024     Lab  Results   Component Value Date    AST 14 (L) 07/10/2024    AST 24 07/06/2021    ALT 37 07/10/2024    ALT 37 07/06/2021       No results found for: \"TSH\", \"T4F\"    Overall glucose control:  Lab Results   Component Value Date    A1C 12.1 (A) 07/16/2024    A1C 10.4 (A) 11/18/2022    A1C 13.2 (A) 10/12/2022       Supplementary Documentation:   -Surveillance for Diabetes Complications & Risks  Foot exam/neuropathy: No data recorded  Retinopathy screening: Last Dilated Eye Exam: 07/16/19 (last seen optometrist 5 years ago)  Eye Exam shows Diabetic Retinopathy?: No          Assessment & Plan:     ICD-10-CM    1. Type 2 diabetes mellitus with hyperglycemia, with long-term current use of insulin (HCC)  E11.65 Microalb/Creat Ratio, Random Urine    Z79.4 Assay, Thyroid Stim Hormone     Lipid Panel     POC Hgb A1C     Continuous Glucose Sensor (FREESTYLE ALEXSANDRA 3 SENSOR) Does not apply Misc      2. Proteinuria due to type 2 diabetes mellitus ([facility])  E11.29 Microalb/Creat Ratio, Random Urine    R80.9       3. Candidiasis  B37.9 fluconazole (DIFLUCAN) 150 MG Oral Tab      4. BMI 38.0-38.9,adult  Z68.38           Jennifer Harrison is a pleasant 38 year old male here for evaluation of:    #Diabetes- PMHx of Type 2 diabetes mellitus diagnosed in 2019.     Last A1c value was 12.1% done 7/16/2024. Above goal. Goal <7%. Importance of better glucose control in preventing onset/progression of end-organ damage discussed, as well as goals of therapy and clinical significance of A1C.   -A1c is trending up, discussed  importance of taking his medicine appropriately.  Discussed to use CGM and how to avail it if price is too expensive.  - med changes:  - - start ozempic 0.25 mg once a week for 2 weeks, then increase to 0.5 mg once a week for 4 weeks, then will titrate it up to 1 mg if you can tolerate medicine.   - restart metformin 1000 mg twice a day   - increase tresiba to 36 units every morning /daily  - check glucose twice a day fasting and 2  hrs after meal  - if next week your glucose is still above 250, message me in the pt portal and will increase the units of insulin  - get fasting labs 1 week prior your follow up with me  - see ophthalmology  - start CGM with phone (connected to clinic profile)  - patient is on insulin, and has suboptimal glycemic control including wide glycemic swings, thus would benefit from CGM   -See above header \"Supplementary Documentation\" for surveillance for diabetes complications & risks  - meet w/ Sheldon for insulin adjustment if needed in 3 weeks  -Will assess for his feet and his last dental exam on next visit    #Nephropathy screening/CKD: Ordered urine microalbumin to be done at least 5 weeks from today  Lab Results   Component Value Date    EGFRCR 96 07/10/2024    MICROALBCREA 160.3 (H) 10/12/2022      #Blood pressure control: SBP is to goal <130   BP Readings from Last 1 Encounters:   07/16/24 128/82   BP Meds:  none     #Hyperlipidemia/Lipids: LDL is not to goal No results found for: \"LDL\", \"TRIG\"Cholesterol Meds:     -Ordered fasting lipid panel.     #Candidiasis  -Discuss that w/ frequent hyperglycemia this symptoms will continue to reoccur.  E script Diflucan 150 mg orally once and may repeat it after 72 hours if symptoms recur.  Confirm patient has no drug allergies.     #bmi 38  -discussed Balanced diet: carbohydrates, protein, healthy fats and fiber in each meal. Exercise of at least 150 mins each week. Decrease your simple carbohydrates intake such as sweets: cookies, soda, candy, white rice, white pasta, syrups    Return in about 6 weeks (around 8/27/2024).    7/16/2024  LEIGHANN Smith    Note to patient: The 21 Century Cures Act makes medical notes like these available to patients in the interest of transparency. However, be advised this is a medical document. It is intended as peer to peer communication. It is written in medical language and may contain abbreviations or verbiage that are unfamiliar. It may  appear blunt or direct. Medical documents are intended to carry relevant information, facts as evident, and the clinical opinion of the practitioner.

## 2024-07-16 NOTE — TELEPHONE ENCOUNTER
7/16/24-Received via fax from [facility] a PA for Ozempic 0.25 or 0.5 mg/dose 2mg/3ml pen injectors.  Placed in PA in basket.

## 2024-07-19 ENCOUNTER — TELEPHONE (OUTPATIENT)
Facility: CLINIC | Age: 39
End: 2024-07-19

## 2024-07-19 NOTE — TELEPHONE ENCOUNTER
Reviewed fax from [facility]- for PA to be considered complete the following information is required: Days Supply    PA generated in [facility]    KEY: [id number]    Answered questions and sent to plan- If [employer] has not responded to your request within 24 hours, contact [facility] at [phone]. If you think there may be a problem with your PA request, use our live chat feature at the bottom right.    Awaiting determination.     Fax placed in RN purple folder.

## 2024-07-19 NOTE — TELEPHONE ENCOUNTER
7/19/24-Received via fax from [employer] a notification that PA request has been withdrawn due to incomplete PA request.  Placed in PA in basket.

## 2024-07-19 NOTE — TELEPHONE ENCOUNTER
7/19/24-Received via fax from [employer] approval for Ozempic 0.25 or 0.5 mg/dose 2mg/3ml pen injectors effective 7/19/24-7/19/25.  Sent to scan

## 2024-07-22 NOTE — TELEPHONE ENCOUNTER
Reviewed reasons for denial    1- requres long term glucose monitoring, at least 3 times daily, adherent to diabetes plan and will be trained on CGM    Have one of the following - Type 2 DM treated with basal and an A1c above goal    Routed for review    Denial placed in RN purple folder

## 2024-07-22 NOTE — TELEPHONE ENCOUNTER
7/22/24-Received from [employer] denial of all Freestyle Renetta products as criteria for medical necessity not met. Placed in PA in basket.

## 2024-07-30 ENCOUNTER — NURSE ONLY (OUTPATIENT)
Facility: CLINIC | Age: 39
End: 2024-07-30
Payer: COMMERCIAL

## 2024-07-30 DIAGNOSIS — E11.65 TYPE 2 DIABETES MELLITUS WITH HYPERGLYCEMIA, WITH LONG-TERM CURRENT USE OF INSULIN (HCC): Primary | ICD-10-CM

## 2024-07-30 DIAGNOSIS — Z79.4 TYPE 2 DIABETES MELLITUS WITH HYPERGLYCEMIA, WITH LONG-TERM CURRENT USE OF INSULIN (HCC): Primary | ICD-10-CM

## 2024-07-30 NOTE — PROGRESS NOTES
Continuous Glucose Monitor Download - Renetta    Jennifer Harrison  8/5/1985    Referred by: IMELDA Smith       Medical Background      Past Medical History:    Abscess of leg    Diabetes (HCC)    DKA (diabetic ketoacidosis) (HCC)        Patient has T2DM, seen today regarding insulin and GLP-1 start    Medication Review      DM Meds: metFORMIN HCl Tabs - 1000 MG; Ozempic (0.25 or 0.5 MG/DOSE) Sopn - 2 MG/3ML; Tresiba FlexTouch Sopn - 100 UNIT/ML, 100 UNIT/ML        Patient Currently Taking:   - metformin 1000 mg twice a day   - tresiba to 36 units every morning /daily    Blood Glucose Review      Still checking with gllucometer, has not picked up Renetta yet     FBG this AM --> 310, around this level most mornings, but this is down from FBG in 400s as previous    Patient Concerns      - Patient has increased Tresiba, but has not started Renetta or Ozempic yet.  Patient states needs to  from pharmacy still.   --> advised that patient should start Renetta especially before his follow up with IMELDA Smith on 8/26      Recommendations / Plan      - Start med and blood glucose check plan as established at Last Office Visit     Next Follow up scheduled for 8/26/2024 with IMELDA Smith     Routed to provider for review.    7/30/2024  Monalisa Morales RN, MSN, BC-[facility], [facility]  Diabetes Care &      A total of 10 minutes was spent with the patient including chart review, discussion and education / advisement pertinent to patient and provider specified concerns as documented above.     Note to patient: The 21 Century Cures Act makes medical notes like these available to patients in the interest of transparency. However, be advised this is a medical document. It is intended as peer to peer communication. It is written in medical language and may contain abbreviations or verbiage that are unfamiliar. It may appear blunt or direct. Medical documents are intended to carry relevant information, facts as evident,  and the clinical opinion of the practitioner.

## 2024-08-12 ENCOUNTER — MED REC SCAN ONLY (OUTPATIENT)
Facility: CLINIC | Age: 39
End: 2024-08-12

## 2024-09-05 ENCOUNTER — HOSPITAL ENCOUNTER (OUTPATIENT)
Age: 39
Discharge: HOME | End: 2024-09-05
Payer: COMMERCIAL

## 2024-09-05 VITALS
TEMPERATURE: 97 F | OXYGEN SATURATION: 98 % | DIASTOLIC BLOOD PRESSURE: 97 MMHG | HEIGHT: 73 IN | SYSTOLIC BLOOD PRESSURE: 145 MMHG | BODY MASS INDEX: 38.43 KG/M2 | WEIGHT: 290 LBS | HEART RATE: 102 BPM | RESPIRATION RATE: 18 BRPM

## 2024-09-05 DIAGNOSIS — B37.42 CANDIDAL BALANITIS: Primary | ICD-10-CM

## 2024-09-05 PROBLEM — E11.9 TYPE 2 DIABETES MELLITUS WITHOUT COMPLICATION (HCC): Status: ACTIVE | Noted: 2024-09-05

## 2024-09-05 LAB
BILIRUB UR QL STRIP: NEGATIVE
CLARITY UR: CLEAR
COLOR UR: YELLOW
GLUCOSE UR STRIP-MCNC: 250 MG/DL
KETONES UR STRIP-MCNC: NEGATIVE MG/DL
LEUKOCYTE ESTERASE UR QL STRIP: NEGATIVE
NITRITE UR QL STRIP: NEGATIVE
PH UR STRIP: 6.5 [PH]
PROT UR STRIP-MCNC: NEGATIVE MG/DL
SP GR UR STRIP: >=1.03
UROBILINOGEN UR STRIP-ACNC: <2 MG/DL

## 2024-09-05 PROCEDURE — 81002 URINALYSIS NONAUTO W/O SCOPE: CPT

## 2024-09-05 PROCEDURE — 99213 OFFICE O/P EST LOW 20 MIN: CPT

## 2024-09-05 PROCEDURE — 87661 TRICHOMONAS VAGINALIS AMPLIF: CPT | Performed by: NURSE PRACTITIONER

## 2024-09-05 RX ORDER — CLOTRIMAZOLE 1 %
1 CREAM (GRAM) TOPICAL 2 TIMES DAILY
Qty: 14 G | Refills: 0 | Status: SHIPPED | OUTPATIENT
Start: 2024-09-05 | End: 2024-09-15

## 2024-09-05 RX ORDER — FLUCONAZOLE 150 MG/1
150 TABLET ORAL AS DIRECTED
Qty: 2 TABLET | Refills: 0 | Status: SHIPPED | OUTPATIENT
Start: 2024-09-05

## 2024-09-05 NOTE — ED PROVIDER NOTES
History     Chief Complaint   Patient presents with    Eval-G       Subjective:   HPI    Jennifer Harrison, 39 year old male with notable medical history of obesity, uncontrolled DM who presents with penile concern. Patient reports Hx candidal balanitis with similar symptoms. He also reports working on his elevated glucose levels. Denies dysuria, fever, chills, abdominal pain. Patient is sexually active with single female partner and is not c/f STD.         Objective:   Past Medical History:    Abscess of leg    Diabetes (HCC)    DKA (diabetic ketoacidosis) (HCC)              History reviewed. No pertinent surgical history.             Social History     Socioeconomic History    Marital status:    Tobacco Use    Smoking status: Former     Current packs/day: 0.00     Types: Cigarettes     Quit date: 10/12/2013     Years since quitting: 10.9    Smokeless tobacco: Never   Vaping Use    Vaping status: Never Used   Substance and Sexual Activity    Alcohol use: Yes     Alcohol/week: 2.0 - 3.0 standard drinks of alcohol     Types: 2 - 3 Cans of beer per week     Comment: once a month    Drug use: Not Currently     Social Determinants of Health      Received from [facility]              No current facility-administered medications on file prior to encounter.     Current Outpatient Medications on File Prior to Encounter   Medication Sig Dispense Refill    Continuous Glucose Sensor (FREESTYLE ALEXSANDRA 3 SENSOR) Does not apply Misc 1 each every 14 (fourteen) days. 6 each 0    metFORMIN HCl 1000 MG Oral Tab Take 1 tablet (1,000 mg total) by mouth 2 (two) times daily with meals. 180 tablet 1    insulin degludec (TRESIBA FLEXTOUCH) 100 UNIT/ML Subcutaneous Solution Pen-injector Inject 36 Units into the skin daily. 32 mL 0    Glucose Blood (CONTOUR NEXT TEST) In Vitro Strip Check Glucose twice daily 200 each 0    Insulin Pen Needle 32G X 4 MM Does not apply Misc Daily with insulin injection 90 each 0     semaglutide (OZEMPIC, 0.25 OR 0.5 MG/DOSE,) 2 MG/3ML Subcutaneous Solution Pen-injector Inject 0.25 mg into the skin once a week for 14 days, THEN 0.5 mg once a week. 1 each 0    [] fluconazole (DIFLUCAN) 150 MG Oral Tab Take 1 tablet (150 mg total) by mouth once for 1 dose. May repeat it after 72 hours 1 tablet 1    [] fluconazole 150 MG Oral Tab Take 1 tablet (150 mg total) by mouth once for 1 dose. 1 tablet 0    [] insulin degludec (TRESIBA FLEXTOUCH) 100 UNIT/ML Subcutaneous Solution Pen-injector Inject 30 Units into the skin daily. 9 mL 0         Review of Systems   Genitourinary:  Positive for penile pain.   All other systems reviewed and are negative.        Constitutional and vital signs reviewed.      All other systems reviewed and negative except as noted above.    I have reviewed the family history, social history, allergies, and outpatient medications.     History reviewed from EMR: Encounters, problem list, allergies, medications      Physical Exam     ED Triage Vitals [24 1803]   BP (!) 145/97   Pulse 102   Resp 18   Temp 97.4 °F (36.3 °C)   Temp src Temporal   SpO2 98 %   O2 Device None (Room air)       Current:BP (!) 145/97   Pulse 102   Temp 97.4 °F (36.3 °C) (Temporal)   Resp 18   Ht 185.4 cm (6' 1\")   Wt 131.5 kg   SpO2 98%   BMI 38.26 kg/m²       Physical Exam  Vitals and nursing note reviewed.   Constitutional:       General: He is not in acute distress.     Appearance: Normal appearance. He is obese. He is not ill-appearing or toxic-appearing.   HENT:      Head: Normocephalic and atraumatic.      Right Ear: External ear normal.      Left Ear: External ear normal.      Nose: Nose normal. No congestion or rhinorrhea.      Mouth/Throat:      Mouth: Mucous membranes are moist.   Eyes:      Extraocular Movements: Extraocular movements intact.      Conjunctiva/sclera: Conjunctivae normal.      Pupils: Pupils are equal, round, and reactive to light.   Cardiovascular:       Rate and Rhythm: Normal rate.      Pulses: Normal pulses.   Pulmonary:      Effort: Pulmonary effort is normal. No respiratory distress.   Genitourinary:     Penis: Uncircumcised.       Testes: Normal.      Comments: Foreskin fully retracted with clear discharge  Musculoskeletal:         General: No swelling, tenderness or signs of injury. Normal range of motion.      Cervical back: Normal range of motion.   Skin:     General: Skin is warm and dry.      Capillary Refill: Capillary refill takes less than 2 seconds.   Neurological:      General: No focal deficit present.      Mental Status: He is alert and oriented to person, place, and time. Mental status is at baseline.   Psychiatric:         Mood and Affect: Mood normal.         Behavior: Behavior normal.         Thought Content: Thought content normal.         Judgment: Judgment normal.            ED Course     Labs Reviewed   [facility] POCT URINALYSIS DIPSTICK - Abnormal; Notable for the following components:       Result Value    Glucose, Urine 250 (*)     Blood, Urine Trace-Intact (*)     All other components within normal limits   TRICH VAG BY GUZMAN     No orders to display       Vitals:    09/05/24 1803   BP: (!) 145/97   Pulse: 102   Resp: 18   Temp: 97.4 °F (36.3 °C)   TempSrc: Temporal   SpO2: 98%   Weight: 131.5 kg   Height: 185.4 cm (6' 1\")            [facility]        Jennifer Harrison, 39 year old male with medical history as noted above who presents with penile concern   -  Patient in NAD, VSS   - balanitis vs STD vs phimosis vs other   - Foreskin able to fully retract   - Given improvement in symptoms from Diflucan in the past, and elevated glucose levels, will treat as candidal balanitis   -  hygiene and dietary care discussed   - f/u care discussed       ** See ED course below for additional information on care provided / interventions / notable events throughout patient's encounter.         ** I have independently reviewed the radiology images, clinical lab  results, and ECG tracings as described above (if applicable)    ** Concerning co-morbidities possibly affecting complaint / care: elevated glucose levels    ** See below for home care instructions (if applicable)        Medical Decision Making  Amount and/or Complexity of Data Reviewed  Labs: ordered. Decision-making details documented in ED Course.    Risk  OTC drugs.  Prescription drug management.        Disposition and Plan     Clinical Impression:  1. Candidal balanitis         Disposition:  Discharge  9/5/2024  6:34 pm    Follow-up:  Mika Bhat MD  [address] [facility] [address]  [facility] [address]  [phone]      Urology specialist    [address]  [facility] [address] [phone]    New primary care provider referral (if needed)          Medications Prescribed:  Discharge Medication List as of 9/5/2024  6:40 PM        START taking these medications    Details   clotrimazole 1 % External Cream Apply 1 Application topically 2 (two) times daily for 10 days., Normal, Disp-14 g, [id number] to substitute volume based on availability             The above patient (and/or guardian) was made aware that an appropriate evaluation has been performed, and that no additional testing is required at this time. In my medical judgment, there is currently no evidence of an immediate life-threatening or surgical condition, therefore discharge is indicated at this time. The patient (and/or guardian) was advised that a small risk still exists that a serious condition could develop. The patient was instructed to arrange close follow-up with their primary care provider (or the referral provider given today). The patient received written and verbal instructions regarding their condition / concerns, demonstrated understanding, and is agreement with the outpatient treatment plan.        Home care instructions:     - Fully retract foreskin and clean gently but  thoroughly, twice daily   - Apply Clotrimazole ointment (over-the-counter) twice daily for 10-days   - Take Diflucan as directed   - Start taking Probiotics daily to help prevent reoccurrence   - Drink plenty of water (4-6 bottles per day)   - Avoid sugary drinks & foods   - Continue making progress with your blood sugar   - Avoid intercourse for 1-week or longer if symptoms remain present   - Follow up with primary care provider or Urology specialist as needed      Hunter Millan, DNP, APRN, AGACNP-BC, FNP-C, CNL  Adult-Gerontology Acute Care & Family Nurse Practitioner  [facility]

## 2024-09-05 NOTE — DISCHARGE INSTRUCTIONS
- Fully retract foreskin and clean gently but thoroughly, twice daily   - Apply Clotrimazole ointment (over-the-counter) twice daily for 10-days   - Take Diflucan as directed   - Start taking Probiotics daily to help prevent reoccurrence   - Drink plenty of water (4-6 bottles per day)   - Avoid sugary drinks & foods   - Continue making progress with your blood sugar   - Avoid intercourse for 1-week or longer if symptoms remain present   - Follow up with primary care provider or Urology specialist as needed

## 2024-09-05 NOTE — ED INITIAL ASSESSMENT (HPI)
C/o penile discharge, itchiness and bleeding skin from tightness >1 month. Went to ED about 1 month ago and was given \"1 pill\" and it helped. Blood glucose today was 230. States girlfriend was treated for BV. Pt is sexually active.

## 2024-09-09 LAB — TRICH VAG NAA: NEGATIVE

## 2024-11-20 ENCOUNTER — MED REC SCAN ONLY (OUTPATIENT)
Facility: CLINIC | Age: 39
End: 2024-11-20

## 2024-11-22 ENCOUNTER — TELEPHONE (OUTPATIENT)
Facility: CLINIC | Age: 39
End: 2024-11-22

## 2024-11-22 NOTE — TELEPHONE ENCOUNTER
11/22/24-Received via fax a medical records request from [employer] ([employer]  Sent to medical records department.

## 2025-01-20 DIAGNOSIS — Z79.4 TYPE 2 DIABETES MELLITUS WITH HYPERGLYCEMIA, WITH LONG-TERM CURRENT USE OF INSULIN (HCC): Primary | ICD-10-CM

## 2025-01-20 DIAGNOSIS — E11.65 TYPE 2 DIABETES MELLITUS WITH HYPERGLYCEMIA, WITH LONG-TERM CURRENT USE OF INSULIN (HCC): Primary | ICD-10-CM

## 2025-01-20 NOTE — TELEPHONE ENCOUNTER
Note placed to pharm: Patient's last office visit was July, patient needs appointment for more refills, refilled for 30 days for now.   No my chart noted, pls let him know of this plan.

## 2025-01-20 NOTE — TELEPHONE ENCOUNTER
Endocrine Refill protocol for metformin    Protocol Criteria:  FAILED  Reason: Elevated A1C    If all below requirements are met, send a 90-day supply with 1 refill per provider protocol.     Verify appointment with Endocrinology completed in the last 6 months or scheduled in the next 3 months.  Verify A1C has been completed within the last 6 months and is below 8.5%  Verify last GFR is greater than or equal to 40 in the past 12 months    Last completed office visit:8/26/2024 Idania Cline APRN   Next scheduled Follow up: No future appointments.    Last GFR result:    Lab Results   Component Value Date    EGFRCR 96 07/10/2024     Last A1c result: Last A1C result: 12.1% done 7/16/2024.

## 2025-03-08 ENCOUNTER — HOSPITAL ENCOUNTER (OUTPATIENT)
Age: 40
Discharge: HOME | End: 2025-03-08
Attending: EMERGENCY MEDICINE
Payer: COMMERCIAL

## 2025-03-08 VITALS
RESPIRATION RATE: 18 BRPM | SYSTOLIC BLOOD PRESSURE: 148 MMHG | TEMPERATURE: 98 F | HEART RATE: 96 BPM | OXYGEN SATURATION: 98 % | DIASTOLIC BLOOD PRESSURE: 94 MMHG

## 2025-03-08 DIAGNOSIS — N48.1 BALANITIS: Primary | ICD-10-CM

## 2025-03-08 PROCEDURE — 99212 OFFICE O/P EST SF 10 MIN: CPT

## 2025-03-08 PROCEDURE — 99213 OFFICE O/P EST LOW 20 MIN: CPT

## 2025-03-08 RX ORDER — CLOTRIMAZOLE 1 %
1 CREAM (GRAM) TOPICAL 2 TIMES DAILY
Qty: 28 G | Refills: 1 | Status: SHIPPED | OUTPATIENT
Start: 2025-03-08 | End: 2025-03-22

## 2025-03-08 NOTE — ED PROVIDER NOTES
Patient Seen in: [facility]      History   No chief complaint on file.    Stated Complaint: g eval, discomfort    Subjective:   HPI      39-year-old male who is a diabetic complaining of redness to the tip of his penis.  This has been bothering for several days he has seems tight when he pulls back the foreskin he is uncircumcised no difficulty urinating no concern for sexually transmitted infection no fever chills glucoses been around 200 recently    Objective:     No pertinent past medical history.            No pertinent past surgical history.              No pertinent social history.            Review of Systems    Positive for stated complaint: g eval, discomfort  Other systems are as noted in HPI.  Constitutional and vital signs reviewed.      All other systems reviewed and negative except as noted above.    Physical Exam     ED Triage Vitals [03/08/25 1100]   BP (!) 148/94   Pulse 96   Resp 18   Temp 98 °F (36.7 °C)   Temp src Oral   SpO2 98 %   O2 Device None (Room air)       Current Vitals:   Vital Signs  BP: (!) 148/94  Pulse: 96  Resp: 18  Temp: 98 °F (36.7 °C)  Temp src: Oral    Oxygen Therapy  SpO2: 98 %  O2 Device: None (Room air)        Physical Exam  Patient is alert oriented 3 no acute distress the genitourinary exam scrotum and testicles appear normal penis is uncircumcised when retract the foreskin the the glans of the penis is has mild erythema with some whitish exudate that seems consistent with Candida infection    ED Course   Labs Reviewed - No data to display                MDM      Initial differential diagnosis includes balanitis paraphimosis sexually-transmitted infection cellulitis        Medical Decision Making    Patient has balanitis and was given antifungal cream advised to follow-up with urology in a couple weeks if not better return if worse  Disposition and Plan     Clinical Impression:  1. Balanitis         Disposition:  Discharge  3/8/2025 11:14  am    Follow-up:  Jodee Mchugh MD  [address]  [address]  [facility] [address]  [phone]    In 1 week      Mika Bhat MD  [address]  [facility] [address]  [facility] [address]  [phone]    In 1 week            Medications Prescribed:  Current Discharge Medication List              Supplementary Documentation:

## 2025-03-08 NOTE — DISCHARGE INSTRUCTIONS
Gently retract the foreskin and clean with saline solution twice daily then apply the clotrimazole cream for 7 to 14 days.  If no improvement after approxi-1 week you can use some over-the-counter hydrocortisone cream in addition of this twice daily.  If you are having difficulty urinating or retracting the foreskin he should return to the emergency department.  Follow-up with urology.

## 2025-07-31 ENCOUNTER — OFFICE VISIT (OUTPATIENT)
Facility: CLINIC | Age: 40
End: 2025-07-31

## 2025-07-31 VITALS
SYSTOLIC BLOOD PRESSURE: 134 MMHG | HEART RATE: 110 BPM | HEIGHT: 73 IN | OXYGEN SATURATION: 97 % | RESPIRATION RATE: 16 BRPM | DIASTOLIC BLOOD PRESSURE: 72 MMHG | BODY MASS INDEX: 36.05 KG/M2 | WEIGHT: 272 LBS

## 2025-07-31 DIAGNOSIS — I15.2 HYPERTENSION ASSOCIATED WITH DIABETES (HCC): ICD-10-CM

## 2025-07-31 DIAGNOSIS — E11.29 PROTEINURIA DUE TO TYPE 2 DIABETES MELLITUS (HCC): ICD-10-CM

## 2025-07-31 DIAGNOSIS — R80.9 PROTEINURIA DUE TO TYPE 2 DIABETES MELLITUS (HCC): ICD-10-CM

## 2025-07-31 DIAGNOSIS — Z79.4 TYPE 2 DIABETES MELLITUS WITH HYPERGLYCEMIA, WITH LONG-TERM CURRENT USE OF INSULIN (HCC): Primary | ICD-10-CM

## 2025-07-31 DIAGNOSIS — E78.5 HYPERLIPIDEMIA ASSOCIATED WITH TYPE 2 DIABETES MELLITUS (HCC): ICD-10-CM

## 2025-07-31 DIAGNOSIS — E11.69 HYPERLIPIDEMIA ASSOCIATED WITH TYPE 2 DIABETES MELLITUS (HCC): ICD-10-CM

## 2025-07-31 DIAGNOSIS — E11.59 HYPERTENSION ASSOCIATED WITH DIABETES (HCC): ICD-10-CM

## 2025-07-31 DIAGNOSIS — E11.65 TYPE 2 DIABETES MELLITUS WITH HYPERGLYCEMIA, WITH LONG-TERM CURRENT USE OF INSULIN (HCC): Primary | ICD-10-CM

## 2025-07-31 LAB
GLUCOSE BLOOD: 346
HEMOGLOBIN A1C: 14.2 % (ref 4.3–5.6)
TEST STRIP LOT #: NORMAL NUMERIC

## 2025-07-31 PROCEDURE — 82947 ASSAY GLUCOSE BLOOD QUANT: CPT | Performed by: NURSE PRACTITIONER

## 2025-07-31 PROCEDURE — 83036 HEMOGLOBIN GLYCOSYLATED A1C: CPT | Performed by: NURSE PRACTITIONER

## 2025-07-31 PROCEDURE — 99214 OFFICE O/P EST MOD 30 MIN: CPT | Performed by: NURSE PRACTITIONER

## 2025-07-31 RX ORDER — DULAGLUTIDE 0.75 MG/.5ML
0.75 INJECTION, SOLUTION SUBCUTANEOUS WEEKLY
Qty: 2 ML | Refills: 0 | Status: SHIPPED | OUTPATIENT
Start: 2025-07-31

## 2025-07-31 RX ORDER — INSULIN GLARGINE 100 [IU]/ML
25 INJECTION, SOLUTION SUBCUTANEOUS EVERY EVENING
Qty: 45 ML | Refills: 0 | Status: SHIPPED | OUTPATIENT
Start: 2025-07-31 | End: 2025-10-29

## 2025-08-07 ENCOUNTER — TELEPHONE (OUTPATIENT)
Facility: CLINIC | Age: 40
End: 2025-08-07